# Patient Record
(demographics unavailable — no encounter records)

---

## 2025-03-25 NOTE — REASON FOR VISIT
[Follow-Up] : a follow-up evaluation of Hydroxyzine Counseling: Patient advised that the medication is sedating and not to drive a car after taking this medication.  Patient informed of potential adverse effects including but not limited to dry mouth, urinary retention, and blurry vision.  The patient verbalized understanding of the proper use and possible adverse effects of hydroxyzine.  All of the patient's questions and concerns were addressed.

## 2025-04-02 NOTE — PLAN
[FreeTextEntry1] : 27y/o  at 8w5d (PRITESH 10/30/25 by [facility]) presents for confirmation of pregnancy  #PNC -1st trimester labs today -NIPS discussed -Prenatal packet provided and reviewed, including rotating call schedule for hospital coverage -Cord collection discussed -Requisition for NT -Behavioral Health resources provided  #MOD -Recommend LTCS due to short interval pregnancy   RTO 4 weeks gladis QUIROS

## 2025-04-02 NOTE — HISTORY OF PRESENT ILLNESS
[FreeTextEntry1] : 25 y/o F presents to determine fetal viability TVUS today shows SLIUP measuring 8w5d (PRITESH 10/30/25 by [facility]) with +FH  Pt reports nausea/vomiting Denies cramping/VB Also reports vaginal itching   OBHx: pLTCS Aug 2024 failed IOL GYNHx: Pap may 2022 NILM, hx of ovarian cyst removal 2020 PMHx: denies PSHx: cystectomy Meds: PNV  All: NKDA SH: neg x3  Pt accepts blood transfusion Pt is accepting of a male physician

## 2025-04-02 NOTE — HISTORY OF PRESENT ILLNESS
[FreeTextEntry1] : 27 y/o F presents to determine fetal viability TVUS today shows SLIUP measuring 8w5d (PRITESH 10/30/25 by [facility]) with +FH  Pt reports nausea/vomiting Denies cramping/VB Also reports vaginal itching   OBHx: pLTCS Aug 2024 failed IOL GYNHx: Pap may 2022 NILM, hx of ovarian cyst removal 2020 PMHx: denies PSHx: cystectomy Meds: PNV  All: NKDA SH: neg x3  Pt accepts blood transfusion Pt is accepting of a male physician

## 2025-05-16 NOTE — PLAN
[FreeTextEntry1] : Health Care Maintenance - CBC, CMP, TSH, a1c performed March 2025. lipid panel to be performed post partum - TDAP June 2024  - Pap May 2022  - depression screen negative - recommend annual skin cancer screening with Dermatologist - recommended annual eye exam with Ophthalmologist - recommended annual dental exam - no sig pmhx - continue lifestyle modifications - CPE in 1 year or sooner visit as needed

## 2025-05-16 NOTE — HISTORY OF PRESENT ILLNESS
[FreeTextEntry1] : CPE [de-identified] : 28 yo F pmh left ovarian cystectomy presents for CPE Accompanied by 9 month old son, Tariq.  Patient is 16 weeks pregnant  Feels well Mother passed away Nov 2024 from neuroendocrine tumor.

## 2025-05-16 NOTE — HEALTH RISK ASSESSMENT
[With Family] : lives with family [Employed] : employed [Significant Other] : lives with significant other [# Of Children ___] : has [unfilled] children [Feels Safe at Home] : Feels safe at home [Fully functional (bathing, dressing, toileting, transferring, walking, feeding)] : Fully functional (bathing, dressing, toileting, transferring, walking, feeding) [Fully functional (using the telephone, shopping, preparing meals, housekeeping, doing laundry, using] : Fully functional and needs no help or supervision to perform IADLs (using the telephone, shopping, preparing meals, housekeeping, doing laundry, using transportation, managing medications and managing finances) [Smoke Detector] : smoke detector [Carbon Monoxide Detector] : carbon monoxide detector [No] : In the past 12 months have you used drugs other than those required for medical reasons? No [0] : 2) Feeling down, depressed, or hopeless: Not at all (0) [PHQ-2 Negative - No further assessment needed] : PHQ-2 Negative - No further assessment needed [Never] : Never [NO] : No [HIV test declined] : HIV test declined [Hepatitis C test declined] : Hepatitis C test declined [Audit-CScore] : 0 [[id number]] : 0 [Change in mental status noted] : No change in mental status noted [Language] : denies difficulty with language [Handling Complex Tasks] : denies difficulty handling complex tasks [Reports changes in hearing] : Reports no changes in hearing [Reports changes in vision] : Reports no changes in vision [Reports changes in dental health] : Reports no changes in dental health [PapSmearDate] : 05/22 [de-identified] : with boyfriend and 9 month old son, Tariq [FreeTextEntry2] : [employer] at [employer] [FreeTextEntry3] : 9 month old son